# Patient Record
Sex: MALE | Race: WHITE | Employment: OTHER | URBAN - METROPOLITAN AREA
[De-identification: names, ages, dates, MRNs, and addresses within clinical notes are randomized per-mention and may not be internally consistent; named-entity substitution may affect disease eponyms.]

---

## 2017-02-16 PROCEDURE — 82043 UR ALBUMIN QUANTITATIVE: CPT | Performed by: FAMILY MEDICINE

## 2017-02-16 PROCEDURE — 82570 ASSAY OF URINE CREATININE: CPT | Performed by: FAMILY MEDICINE

## 2017-02-16 PROCEDURE — 81001 URINALYSIS AUTO W/SCOPE: CPT | Performed by: FAMILY MEDICINE

## 2017-03-09 PROBLEM — E11.22 STAGE 3 CHRONIC RENAL IMPAIRMENT ASSOCIATED WITH TYPE 2 DIABETES MELLITUS (HCC): Status: ACTIVE | Noted: 2017-03-09

## 2017-03-09 PROBLEM — N18.30 STAGE 3 CHRONIC RENAL IMPAIRMENT ASSOCIATED WITH TYPE 2 DIABETES MELLITUS (HCC): Status: ACTIVE | Noted: 2017-03-09

## 2018-06-15 PROCEDURE — 36415 COLL VENOUS BLD VENIPUNCTURE: CPT | Performed by: FAMILY MEDICINE

## 2018-06-15 PROCEDURE — 82570 ASSAY OF URINE CREATININE: CPT | Performed by: FAMILY MEDICINE

## 2018-06-15 PROCEDURE — 82043 UR ALBUMIN QUANTITATIVE: CPT | Performed by: FAMILY MEDICINE

## 2018-07-03 PROBLEM — M65.332 TRIGGER MIDDLE FINGER OF LEFT HAND: Status: ACTIVE | Noted: 2018-07-03

## 2019-03-22 PROBLEM — M65.332 TRIGGER MIDDLE FINGER OF LEFT HAND: Status: RESOLVED | Noted: 2018-07-03 | Resolved: 2019-03-22

## 2019-03-22 PROBLEM — E11.9 TYPE 2 DIABETES MELLITUS WITHOUT COMPLICATION, WITHOUT LONG-TERM CURRENT USE OF INSULIN (HCC): Status: ACTIVE | Noted: 2019-03-22

## 2019-05-26 ENCOUNTER — APPOINTMENT (OUTPATIENT)
Dept: CT IMAGING | Facility: HOSPITAL | Age: 84
DRG: 871 | End: 2019-05-26
Payer: MEDICARE

## 2019-05-26 ENCOUNTER — HOSPITAL ENCOUNTER (INPATIENT)
Facility: HOSPITAL | Age: 84
LOS: 3 days | Discharge: HOME HEALTH | DRG: 871 | End: 2019-05-29
Admitting: INTERNAL MEDICINE
Payer: MEDICARE

## 2019-05-26 ENCOUNTER — APPOINTMENT (OUTPATIENT)
Dept: GENERAL RADIOLOGY | Facility: HOSPITAL | Age: 84
DRG: 871 | End: 2019-05-26
Payer: MEDICARE

## 2019-05-26 ENCOUNTER — APPOINTMENT (OUTPATIENT)
Dept: CV DIAGNOSTICS | Facility: HOSPITAL | Age: 84
DRG: 871 | End: 2019-05-26
Attending: INTERNAL MEDICINE
Payer: MEDICARE

## 2019-05-26 DIAGNOSIS — J18.9 COMMUNITY ACQUIRED PNEUMONIA OF LEFT LOWER LOBE OF LUNG: ICD-10-CM

## 2019-05-26 DIAGNOSIS — A41.9 SEPSIS DUE TO URINARY TRACT INFECTION (HCC): Primary | ICD-10-CM

## 2019-05-26 DIAGNOSIS — R53.83 FATIGUE, UNSPECIFIED TYPE: ICD-10-CM
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
## 2019-05-26 DIAGNOSIS — N39.0 SEPSIS DUE TO URINARY TRACT INFECTION (HCC): Primary | ICD-10-CM

## 2019-05-26 PROCEDURE — 85730 THROMBOPLASTIN TIME PARTIAL: CPT

## 2019-05-26 PROCEDURE — 93306 TTE W/DOPPLER COMPLETE: CPT | Performed by: INTERNAL MEDICINE

## 2019-05-26 PROCEDURE — 84484 ASSAY OF TROPONIN QUANT: CPT

## 2019-05-26 PROCEDURE — 83735 ASSAY OF MAGNESIUM: CPT | Performed by: INTERNAL MEDICINE

## 2019-05-26 PROCEDURE — 71045 X-RAY EXAM CHEST 1 VIEW: CPT

## 2019-05-26 PROCEDURE — 87081 CULTURE SCREEN ONLY: CPT | Performed by: INTERNAL MEDICINE

## 2019-05-26 PROCEDURE — 85027 COMPLETE CBC AUTOMATED: CPT

## 2019-05-26 PROCEDURE — 81001 URINALYSIS AUTO W/SCOPE: CPT

## 2019-05-26 PROCEDURE — 99285 EMERGENCY DEPT VISIT HI MDM: CPT

## 2019-05-26 PROCEDURE — 83605 ASSAY OF LACTIC ACID: CPT

## 2019-05-26 PROCEDURE — 85027 COMPLETE CBC AUTOMATED: CPT | Performed by: INTERNAL MEDICINE

## 2019-05-26 PROCEDURE — 83036 HEMOGLOBIN GLYCOSYLATED A1C: CPT | Performed by: INTERNAL MEDICINE

## 2019-05-26 PROCEDURE — 85007 BL SMEAR W/DIFF WBC COUNT: CPT

## 2019-05-26 PROCEDURE — 80053 COMPREHEN METABOLIC PANEL: CPT

## 2019-05-26 PROCEDURE — 87086 URINE CULTURE/COLONY COUNT: CPT

## 2019-05-26 PROCEDURE — 87088 URINE BACTERIA CULTURE: CPT

## 2019-05-26 PROCEDURE — 85025 COMPLETE CBC W/AUTO DIFF WBC: CPT

## 2019-05-26 PROCEDURE — 96367 TX/PROPH/DG ADDL SEQ IV INF: CPT

## 2019-05-26 PROCEDURE — 87186 SC STD MICRODIL/AGAR DIL: CPT

## 2019-05-26 PROCEDURE — 93010 ELECTROCARDIOGRAM REPORT: CPT

## 2019-05-26 PROCEDURE — 96365 THER/PROPH/DIAG IV INF INIT: CPT

## 2019-05-26 PROCEDURE — 93005 ELECTROCARDIOGRAM TRACING: CPT

## 2019-05-26 PROCEDURE — 84484 ASSAY OF TROPONIN QUANT: CPT | Performed by: INTERNAL MEDICINE

## 2019-05-26 PROCEDURE — 82962 GLUCOSE BLOOD TEST: CPT

## 2019-05-26 PROCEDURE — 85610 PROTHROMBIN TIME: CPT

## 2019-05-26 PROCEDURE — 70450 CT HEAD/BRAIN W/O DYE: CPT

## 2019-05-26 PROCEDURE — 87040 BLOOD CULTURE FOR BACTERIA: CPT

## 2019-05-26 PROCEDURE — 93010 ELECTROCARDIOGRAM REPORT: CPT | Performed by: INTERNAL MEDICINE

## 2019-05-26 RX ORDER — HEPARIN SODIUM 5000 [USP'U]/ML
5000 INJECTION, SOLUTION INTRAVENOUS; SUBCUTANEOUS EVERY 8 HOURS SCHEDULED
Status: DISCONTINUED | OUTPATIENT
Start: 2019-05-26 | End: 2019-05-28

## 2019-05-26 RX ORDER — PHENAZOPYRIDINE HYDROCHLORIDE 200 MG/1
200 TABLET, FILM COATED ORAL
Status: COMPLETED | OUTPATIENT
Start: 2019-05-26 | End: 2019-05-28

## 2019-05-26 RX ORDER — DEXTROSE MONOHYDRATE 25 G/50ML
50 INJECTION, SOLUTION INTRAVENOUS
Status: DISCONTINUED | OUTPATIENT
Start: 2019-05-26 | End: 2019-05-29

## 2019-05-26 RX ORDER — SODIUM CHLORIDE 9 MG/ML
INJECTION, SOLUTION INTRAVENOUS CONTINUOUS
Status: ACTIVE | OUTPATIENT
Start: 2019-05-26 | End: 2019-05-26

## 2019-05-26 RX ORDER — SODIUM CHLORIDE 9 MG/ML
INJECTION, SOLUTION INTRAVENOUS CONTINUOUS
Status: DISCONTINUED | OUTPATIENT
Start: 2019-05-26 | End: 2019-05-28

## 2019-05-26 RX ORDER — METOCLOPRAMIDE HYDROCHLORIDE 5 MG/ML
5 INJECTION INTRAMUSCULAR; INTRAVENOUS EVERY 8 HOURS PRN
Status: DISCONTINUED | OUTPATIENT
Start: 2019-05-26 | End: 2019-05-29

## 2019-05-26 RX ORDER — ACETAMINOPHEN 500 MG
1000 TABLET ORAL ONCE
Status: COMPLETED | OUTPATIENT
Start: 2019-05-26 | End: 2019-05-26

## 2019-05-26 RX ORDER — ATORVASTATIN CALCIUM 40 MG/1
40 TABLET, FILM COATED ORAL NIGHTLY
Status: DISCONTINUED | OUTPATIENT
Start: 2019-05-26 | End: 2019-05-29

## 2019-05-26 RX ORDER — ACETAMINOPHEN 325 MG/1
650 TABLET ORAL EVERY 6 HOURS PRN
Status: DISCONTINUED | OUTPATIENT
Start: 2019-05-26 | End: 2019-05-29

## 2019-05-26 RX ORDER — ONDANSETRON 2 MG/ML
4 INJECTION INTRAMUSCULAR; INTRAVENOUS EVERY 6 HOURS PRN
Status: DISCONTINUED | OUTPATIENT
Start: 2019-05-26 | End: 2019-05-29

## 2019-05-26 NOTE — ED INITIAL ASSESSMENT (HPI)
A/O x 4. Patient presents with generalized weakness, fall x 2 today, difficulty urinating and fever at home per family. Patient states that his fall earlier was off the toilet, and the send fall was out of bed.   Denies any head injury

## 2019-05-26 NOTE — PLAN OF CARE
Received patient at 7am just admitted from ER with septic shock, UTI, ARF, elevated troponin, falls at home. Alert and oriented. Denies pain. See full assessment. Sepsis protocol followed. Cardiology consulted, orders received.  Plan to monitor closely in I

## 2019-05-26 NOTE — PROGRESS NOTES
[facility] Pharmacy Note: Antimicrobial Weight Dose Adjustment for: piperacillin/tazobactam (Marietta Arias)    Sayra Duckworth is a 80year old male who has been prescribed piperacillin/tazobactam (ZOSYN) 3.375 gm x 1 dose.   CrCl is CrCl cannot be calculated (Patient'

## 2019-05-26 NOTE — CONSULTS
[facility] Cardiology Consultation    Yoon Gonzalez Patient Status:  Inpatient    1929 MRN [id number]   [facility] 4SW-A Attending Ector Marie MD   Hosp Day # 0 PCP Kiera Mcdowell MD     Reason for Consultation:  Elevated tr SWELLING    Medications:  • Heparin Sodium (Porcine)  5,000 Units Subcutaneous Q8H [address]   • piperacillin-tazobactam  4.5 g Intravenous Q8H   • Insulin Aspart Pen  1-5 Units Subcutaneous TID CC and HS       Continuous Infusions:  • sodium chloride 83 mL/hr a Telemetry:     Laboratories and Data:  Diagnostics:    EKG, 5/26/2019:  No acute change     CXR, 5/26/2019: *        PROCEDURE:  XR CHEST AP PORTABLE  (CPT=71045)     TECHNIQUE:  AP chest radiograph was obtained. COMPARISON:  None.      EDDIE

## 2019-05-26 NOTE — H&P
[facility] Hospitalist H&P       CC: Patient presents with:  Fever (infectious)  Fall (musculoskeletal, neurologic)  Dizziness (neurologic)       PCP: Ariana Garcia MD    History of Present Illness:  79y/o M with hx CAD s/p remote CABG, HTN, HLD, DMII, [facility] Encounter):  glipiZIDE 10 MG Oral Tab Take 0.5 tablets (5 mg total) by mouth daily. Disp: 180 tablet Rfl: 3   Losartan Potassium 50 MG Oral Tab Take 1 tablet (50 mg total) by mouth once daily.  Disp: 90 tablet Rfl: 3   metoprolol Tartrate 25 MG Or COPD       Review of Systems  Comprehensive 10-point ROS reviewed and negative except for what is stated above in HPI. Including negative for fevers, chills, chest pain, shortness of breath, syncope.       OBJECTIVE:   05/26/19  0830 05/26/19  0900 COMPARISON:  None. INDICATIONS:  Fever, generalized weakness, and fall  TECHNIQUE:  Noncontrast CT scanning is performed through the brain. Dose reduction techniques were used.  Dose information is transmitted to the [facility] [employer] of Radiology) NRD CONCLUSION:  Opacification the left lung base could represent atelectasis and/or effusion.     Dictated by: Roberto Okeefe MD on 5/26/2019 at 8:13     Approved by: Roberto Okeefe MD                   ASSESSMENT / PLAN:     81y/o M with hx CAD s/p remote C

## 2019-05-26 NOTE — ED PROVIDER NOTES
Patient Seen in: [facility] Emergency Department    History   Patient presents with:  Fever (infectious)  Fall (musculoskeletal, neurologic)  Dizziness (neurologic)    Stated Complaint: Fever, generalized weakness, and fall    HPI    80year-old mal Jony Ziegler)    Hx colon polyps: diverticulosis   • HERNIA SURGERY      Bilateral   • HYDROCELECTOMY ADULT Left 11/13/2014    Performed by Brandin Hart MD at [facility] MAIN OR   • TONSILLECTOMY             Social History    Tobacco Use      Smoking status: For tenderness throughout  No midline cervical thoracic or lumbosacral spine tenderness  Skin: Unremarkable without lesions or rash. Neurologic: Awake alert and oriented x 3 with clear speech    General fatigue, 4 out of 5 strength all extremities equally. Abnormal; Notable for the following components:    POC Glucose 124 (*)     All other components within normal limits   CBC W/ DIFFERENTIAL - Abnormal; Notable for the following components:    WBC 25.7 (*)     HGB 12.8 (*)     HCT 38.3 (*)     RDW-SD 49.4 ( calcifications.         [facility]      Sepsis Reassessment Note    /41   Pulse 85   Temp 98.5 °F (36.9 °C) (Temporal)   Resp 19   Ht 177.8 cm (5' 10\")   Wt 117.8 kg   SpO2 95%   BMI 37.26 kg/m²      6:51 AM    Cardiac:  Regularity: Regular  Rat

## 2019-05-26 NOTE — PLAN OF CARE
Patient having severe burning pain in penis area, Tyshawn ANNE notified and pyridium ordered and administered. Set up in bed for dinner and report given to [facility].

## 2019-05-26 NOTE — PROGRESS NOTES
[facility] Pharmacy Note:  Renal Dose Adjustment for Metoclopramide (REGLAN)    Aurea Aguilar has been prescribed Metoclopramide (REGLAN) 10 mg every 8 hours as needed for nausea/vomiting.     Estimated Creatinine Clearance: 27.9 mL/min (A) (based on SCr of 1

## 2019-05-26 NOTE — ED NOTES
Family to nursing station stating pt is feeling weak. Accucheck completed and WNL. Requesting juice, notified of NPO status.

## 2019-05-26 NOTE — CONSULTS
Critical Care H&P/Consult       NAME: 2 [facility],[address] Floor: 104/128-M - MRN: [id number] - Age: 80year old - :  1929    Date of Admission: 2019  2:01 AM  Admission Diagnosis: Sepsis due to urinary tract infection ([facility] [A41.9, N39.0]  Fall Stella Quinonez MD at [facility] MAIN OR   • TONSILLECTOMY          Medications Prior to Admission:  glipiZIDE 10 MG Oral Tab Take 0.5 tablets (5 mg total) by mouth daily.  Disp: 180 tablet Rfl: 3 Taking   Losartan Potassium 50 MG Oral Tab Take 1 tablet (50 mg t file      Years of education: Not on file      Highest education level: Not on file    Occupational History      Not on file    Social Needs      Financial resource strain: Not on file      Food insecurity:        Worry: Not on file        Inability: Not o TABLET TWICE DAILY Disp: 180 tablet Rfl: 3   hydrochlorothiazide 25 MG Oral Tab Take 1 tablet (25 mg total) by mouth once daily.  Disp: 90 tablet Rfl: 3   tamsulosin HCl 0.4 MG Oral Cap TAKE 1 CAPSULE EVERY DAY Disp: 90 capsule Rfl: 3   indomethacin 50 MG O pain/tenderness  RESP:  denies SOB, cough, dyspnea   CARDS:  denies current chest pain   GI:  denies abdominal pain  :  denies dysuria or changes in stream   MSK:  denies back pain   NEURO:  denies headaches, no strokes or seizure history   PSYCH:  denie Soft, non-tender, bowel sounds active all four quadrants,     no masses, no organomegaly   Extremities:   Extremities normal, atraumatic, no cyanosis or edema   Pulses:   2+ and symmetric all extremities   Skin:   Skin color, texture, turgor normal, no ra replace as needed  -diet as tolerated  7. Proph  -heparin  8.  Dispo  -ICU        Critical Care Time greater than: [facility] Pulmonary and Critical Care

## 2019-05-26 NOTE — PROGRESS NOTES
[facility] Pharmacy Note: Antimicrobial Weight Dose Adjustment for: piperacillin/tazobactam (Farhad RomeroAnna Garcia is a 80year old male who has been prescribed piperacillin/tazobactam (ZOSYN) 3.375 gm every 8 hours.   CrCl is estimated creatinine clearance

## 2019-05-27 PROCEDURE — 83605 ASSAY OF LACTIC ACID: CPT | Performed by: INTERNAL MEDICINE

## 2019-05-27 PROCEDURE — 82962 GLUCOSE BLOOD TEST: CPT

## 2019-05-27 PROCEDURE — 85027 COMPLETE CBC AUTOMATED: CPT | Performed by: INTERNAL MEDICINE

## 2019-05-27 PROCEDURE — 97162 PT EVAL MOD COMPLEX 30 MIN: CPT

## 2019-05-27 PROCEDURE — 80048 BASIC METABOLIC PNL TOTAL CA: CPT | Performed by: INTERNAL MEDICINE

## 2019-05-27 PROCEDURE — 93005 ELECTROCARDIOGRAM TRACING: CPT

## 2019-05-27 PROCEDURE — 97530 THERAPEUTIC ACTIVITIES: CPT

## 2019-05-27 RX ORDER — ALFUZOSIN HYDROCHLORIDE 10 MG/1
10 TABLET, EXTENDED RELEASE ORAL
Status: DISCONTINUED | OUTPATIENT
Start: 2019-05-27 | End: 2019-05-29

## 2019-05-27 RX ORDER — OXYBUTYNIN CHLORIDE 5 MG/1
5 TABLET, EXTENDED RELEASE ORAL DAILY
Status: DISCONTINUED | OUTPATIENT
Start: 2019-05-27 | End: 2019-05-28

## 2019-05-27 NOTE — PROGRESS NOTES
[facility] Hospitalist Progress Note     Olga Lidia Dumas Patient Status:  Inpatient    1929 MRN [id number]   [facility] 5NW-A Attending Farzad Balderas MD   Hosp Day # 1 PCP Collette Quitter, MD     CC: follow up    SUBJECTIVE:  Columba Patel Subcutaneous Q8H [address]   • piperacillin-tazobactam  4.5 g Intravenous Q8H   • Insulin Aspart Pen  1-5 Units Subcutaneous TID CC and HS   • atorvastatin  40 mg Oral Nightly   • Phenazopyridine HCl  200 mg Oral TID CC     Continuous Infusing Medication:  • sod

## 2019-05-27 NOTE — PLAN OF CARE
Received pt alert, oriented, following commands. On room air with diminished breath sounds. Occasional expiratory wheezes. Afebrile. Blood pressure stable. Normal sinus rhythm. SCD's placed for DVT prophylaxis. Bmx2 overnight.  Inability to urinate requirin

## 2019-05-27 NOTE — PROGRESS NOTES
Patient complaining of intermittent penial pains/bladder spasms that continue to \"come in waves\" despite initiation of pyridium last evening. Will add Ditropan to regimen and follow response.     [facility] on-call, Dr Ryanne Aranda, aware of above    Michelle Worley,

## 2019-05-27 NOTE — PROGRESS NOTES
NURSING ADMISSION NOTE      Patient transferred via Cart to room 512 from ICU  Oriented to room. Safety precautions initiated. Bed in low position. Call light in reach.     VSS, afebrile, maintaining O2 sats >94% on RA

## 2019-05-27 NOTE — PROGRESS NOTES
[facility] Cardiology Progress Note        Chuy Fulton Patient Status:  Inpatient    1929 MRN [id number]   [facility] 5NW-A Attending Wen Spring MD   Hosp Day # 1 PCP Elkin Fernandez MD     Sub percussion. Abdomen: Soft, non-tender. Extremities: No LE edema. No clubbing or cyanosis. Neurologic: Alert and oriented, normal affect. Skin: Warm and dry.            LABS:      HEM:  Recent Labs   Lab 05/26/19  0213 05/26/19  5649 05/27/19  0435

## 2019-05-27 NOTE — PROGRESS NOTES
Quick note  Pt transferred out of ICU overnight  No pulm issues  Will follow PRN, please call w/ Questions

## 2019-05-27 NOTE — PLAN OF CARE
Pt AOx4. Glasses. VSS on RA. . Tele, NSR/1st degree. Subq hep. Scds. Dw. Sonny Goldberg. Pt retaining urine. Following urinary retention protocol. Pt was straight cathd this AM with output of 1L. Pt c.o penile pain, see MAR. Up x1 with walker. Ivf. Iv abx.  Diabe schedule  Outcome: Progressing     Problem: MUSCULOSKELETAL - ADULT  Goal: Return mobility to safest level of function  Description  INTERVENTIONS:  - Assess patient stability and activity tolerance for standing, transferring and ambulating w/ or w/o kole range  - Assess barriers to adequate nutritional intake and initiate nutrition consult as needed  - Instruct patient on self management of diabetes  Outcome: Progressing     Problem: Patient/Family Goals  Goal: Patient/Family Long Term Goal  Description  P

## 2019-05-27 NOTE — PHYSICAL THERAPY NOTE
PHYSICAL THERAPY EVALUATION - INPATIENT     Room Number: 999/901-D  Evaluation Date: 5/27/2019  Type of Evaluation: Initial  Physician Order: PT Eval and Treat    Presenting Problem: Weakness; fall at home prior to admit.    Reason for Therapy: Mobility EDW Shan Bee)    Hx colon polyps: diverticulosis   • HERNIA SURGERY      Bilateral   • HYDROCELECTOMY ADULT Left 11/13/2014    Performed by Lang Marquez MD at [facility] MAIN OR   • TONSILLECTOMY         HOME SITUATION  Type of Home: House   Home Layout:  On Turning over in bed (including adjusting bedclothes, sheets and blankets)?: None   -   Sitting down on and standing up from a chair with arms (e.g., wheelchair, bedside commode, etc.): A Little   -   Moving from lying on back to sitting on the side of the Exercise/Education Provided:    Educ: activity recommendations, role of inpt PT, DC recommendation, assessment findings, goals and expectations.      Functional activity tolerated    Patient End of Session: Up in chair;Needs met;Call light within reac Daily  Number of Visits to Meet Established Goals: 5    CURRENT GOALS       Goal #1 Patient is able to demonstrate supine - sit EOB @ level: modified independent     Goal #2 Patient is able to demonstrate transfers Sit to/from Stand at assistance level: mo

## 2019-05-28 ENCOUNTER — APPOINTMENT (OUTPATIENT)
Dept: CT IMAGING | Facility: HOSPITAL | Age: 84
DRG: 871 | End: 2019-05-28
Attending: PHYSICIAN ASSISTANT
Payer: MEDICARE

## 2019-05-28 PROCEDURE — 74176 CT ABD & PELVIS W/O CONTRAST: CPT | Performed by: PHYSICIAN ASSISTANT

## 2019-05-28 PROCEDURE — 36415 COLL VENOUS BLD VENIPUNCTURE: CPT

## 2019-05-28 PROCEDURE — 93005 ELECTROCARDIOGRAM TRACING: CPT

## 2019-05-28 PROCEDURE — 82962 GLUCOSE BLOOD TEST: CPT

## 2019-05-28 PROCEDURE — 97164 PT RE-EVAL EST PLAN CARE: CPT

## 2019-05-28 PROCEDURE — 93010 ELECTROCARDIOGRAM REPORT: CPT | Performed by: INTERNAL MEDICINE

## 2019-05-28 PROCEDURE — 85025 COMPLETE CBC W/AUTO DIFF WBC: CPT | Performed by: INTERNAL MEDICINE

## 2019-05-28 PROCEDURE — 51702 INSERT TEMP BLADDER CATH: CPT

## 2019-05-28 PROCEDURE — 97530 THERAPEUTIC ACTIVITIES: CPT

## 2019-05-28 PROCEDURE — 80048 BASIC METABOLIC PNL TOTAL CA: CPT | Performed by: INTERNAL MEDICINE

## 2019-05-28 PROCEDURE — 84132 ASSAY OF SERUM POTASSIUM: CPT | Performed by: INTERNAL MEDICINE

## 2019-05-28 RX ORDER — DILTIAZEM HYDROCHLORIDE 5 MG/ML
10 INJECTION INTRAVENOUS ONCE
Status: COMPLETED | OUTPATIENT
Start: 2019-05-28 | End: 2019-05-28

## 2019-05-28 RX ORDER — DILTIAZEM HYDROCHLORIDE 60 MG/1
60 TABLET, FILM COATED ORAL EVERY 6 HOURS SCHEDULED
Status: DISCONTINUED | OUTPATIENT
Start: 2019-05-28 | End: 2019-05-29

## 2019-05-28 RX ORDER — METOPROLOL TARTRATE 50 MG/1
50 TABLET, FILM COATED ORAL
Status: DISCONTINUED | OUTPATIENT
Start: 2019-05-28 | End: 2019-05-29

## 2019-05-28 RX ORDER — FLUTICASONE PROPIONATE 50 MCG
1 SPRAY, SUSPENSION (ML) NASAL DAILY
Status: DISCONTINUED | OUTPATIENT
Start: 2019-05-28 | End: 2019-05-29

## 2019-05-28 RX ORDER — DILTIAZEM HYDROCHLORIDE 60 MG/1
60 TABLET, FILM COATED ORAL AS NEEDED
Status: DISCONTINUED | OUTPATIENT
Start: 2019-05-28 | End: 2019-05-29

## 2019-05-28 RX ORDER — METOPROLOL TARTRATE 50 MG/1
50 TABLET, FILM COATED ORAL
Status: DISCONTINUED | OUTPATIENT
Start: 2019-05-28 | End: 2019-05-28

## 2019-05-28 RX ORDER — DILTIAZEM HYDROCHLORIDE 5 MG/ML
5 INJECTION INTRAVENOUS ONCE
Status: DISCONTINUED | OUTPATIENT
Start: 2019-05-28 | End: 2019-05-28

## 2019-05-28 RX ORDER — POTASSIUM CHLORIDE 20 MEQ/1
40 TABLET, EXTENDED RELEASE ORAL EVERY 4 HOURS
Status: COMPLETED | OUTPATIENT
Start: 2019-05-28 | End: 2019-05-28

## 2019-05-28 NOTE — CM/SW NOTE
05/28/19 1000   CM/SW Referral Data   Referral Source Physician;Social Work (self-referral)   Reason for Referral Discharge planning   Informant Patient; Children   Pertinent Medical Hx   Primary Care Physician Name [facility]   Patient Info   Patient's

## 2019-05-28 NOTE — PHYSICAL THERAPY NOTE
Pt transferred from #512 to #1628 for cardiac workup. Attempted to reach RN via phone at [phone]. Per department protocol - will require PT re-eval when medically appropriate.   Per IPTE on 5/27/2019 - d/c recommendation = AJITH

## 2019-05-28 NOTE — PROGRESS NOTES
[facility] Pharmacy Note: Antimicrobial Weight Dose Adjustment for: ceftriaxone (ROCEPHIN)    Stuart Armstrong is a 80year old male who has been prescribed ceftriaxone (ROCEPHIN) 1000 mg every 24 hours.   CrCl is estimated creatinine clearance is 42.2 mL/min (b

## 2019-05-28 NOTE — PLAN OF CARE
Patient received as transfer from [facility]. Patient a&ox4. On arrival - afib on tele with HR to 140's. Cardizem bolus was given prior to transfer. Cardiology paged and orders received to start Cardizem drip per protocol. Drip infusing at 10mg/hour.  -110's

## 2019-05-28 NOTE — OCCUPATIONAL THERAPY NOTE
Pt with OT orders from 5/26. Pt is s/p transfer from PMU to CTU 5/28 due to Afib. Pt placed on hold. Will need new OT orders as appropriate.

## 2019-05-28 NOTE — PHYSICAL THERAPY NOTE
PHYSICAL THERAPY EVALUATION - INPATIENT     Room Number: 5939  Evaluation Date: 5/28/2019  Type of Evaluation: Re-evaluation  Physician Order: (Reevaluation)    Presenting Problem: Weakness; fall at home prior to admit.    Reason for Therapy: Mobility D COLONOSCOPY,DIAGNOSTIC  5/15/07  EDW Gretchen Fuentes)    Hx colon polyps: diverticulosis   • HERNIA SURGERY      Bilateral   • HYDROCELECTOMY ADULT Left 11/13/2014    Performed by Satish Flower MD at [facility] MAIN OR   • [address]  Type O2 WALK  SPO2 on Room Air at Rest: 98        AM-PAC '6-Clicks' [address]  How much difficulty does the patient currently have. ..  -   Turning over in bed (including adjusting bedclothes, sheets and blankets)?: None   -   Sitti within reach;RN aware of session/findings; All patient questions and concerns addressed; Discussed recommendations with /(being transported for Renal scan)    Eval completed.     ASSESSMENT     Patient is a 80year old male admitted o training;Strengthening;Stair training;Transfer training;Balance training  Rehab Potential : Good  Frequency (Obs): Daily  Number of Visits to Meet Established Goals: 5    CURRENT GOALS       Goal #1 Patient is able to demonstrate supine - sit EOB @ level:

## 2019-05-28 NOTE — CM/SW NOTE
1:10pm  MSW called Pt's dtr-VM full.    2:46pm  MSW spoke to patient's dtr and she is agreeable to [facility].  MSW emailed her info on [place]  program. Dtr states she plans to meet with visiting jermaine on 6/3 to get private duty services in the home for her

## 2019-05-28 NOTE — HOME CARE LIAISON
Referral from [facility]. Met with patient to offer home health. Patient requested that TNL speak with daughter. Unable to leave a voice message for daughter, Yolanda Sims, her mailbox is full. Will try again later.   Brochure and contact information given to

## 2019-05-28 NOTE — PROGRESS NOTES
Patient A/Ox4. Vitals stable. Wheezing to lungs. Difficulty starting stream, complains of pain with urination. Pyridium ordered TID. Heparin. SCD. Patient is on tele; NSR with 1st degree block. Up x 1 with walker. Briefed.        Patient converted to Afib R

## 2019-05-28 NOTE — PROGRESS NOTES
[facility] Cardiology Progress Note        Bar Judge Patient Status:  Inpatient    1929 MRN [id number]   [facility] 8NE-A Attending Kerrin Cowden, MD   Hosp Day # 2 PCP Virl Dubin, MD     [address]  Resp:  [16-20] 18  BP: (111-159)/(50-92) 111/57      Temp: 98 °F (36.7 °C)  Pulse: 66  Resp: 18  BP: 111/57  General:  Appears comfortable  HEENT: No focal deficits. Neck: No JVD, carotids 2+ no bruits. Cardiac: Irregular S1S2.   No S3, S4, rub, click comparison. *                          Impression:     1.  Septic shock . 2/2 urosepsis? Olya Band LLL infiltrate vs. PNA on CXR  2.  Hypertension    3.  CAD, s/p 4 vessel  CABG, 1998  4.  acute renal insufficiency, 2/2 sepsis.   resolved  5.  Elevated troponin ,

## 2019-05-28 NOTE — HOME CARE LIAISON
Received call back from daughter, Sadia Toribio. She is agreeable to [facility] for RN PT and HHA services, pending insurance copays. [facility] will run insurance and inform Sadia Toribio once insurance is verified.   Thanks for the referral.    Updated daughter

## 2019-05-28 NOTE — CONSULTS
[facility] Urology   Consultation Note    Chuy Fulton Patient Status:  Inpatient    1929 MRN [id number]   [facility] 8NE-A Attending Wen Spring MD   [facility] Day # 2 PCP Elkin Fernandez MD     Reason for Consu hydrocele 7/25/2014   • Melanoma ([facility] [address]     Left cheek (10 yrs ago)   • Mixed hyperlipidemia 7/25/2014   • Obesity 7/25/2014   • Other and unspecified hyperlipidemia    • Trigger middle finger of left hand 7/3/2018   • Type II or unspecified type diabetes m mg, 650 mg, Oral, Q6H PRN  •  ondansetron HCl (ZOFRAN) injection 4 mg, 4 mg, Intravenous, Q6H PRN  •  Metoclopramide HCl (REGLAN) injection 5 mg, 5 mg, Intravenous, Q8H PRN  •  Piperacillin Sod-Tazobactam So (ZOSYN) 4.5 g in dextrose 5 % 100 mL ADD-vantage 25.7-->23.1-->22-->16.6    Serum creat 1.82-->2.07-->1.41-->1.2    UA 5/26/19: >50 WBC/hpf, >10 RBC/hpf, 3+ bacteria, small squamous epithelial cells, WBC clump present  Urine culture 5/26/19: >100K CFU/mL E.  Coli    2/2 Blood culture 5/26/19: prelim no gr

## 2019-05-28 NOTE — PLAN OF CARE
Controlled a fib on telemetry. Cardizem drip maintained at 5mg/hr with rates . VSS. No c/o cardiac symptoms. Cardizem drip discontinued. PO Cardizem started. Rates currently in the low 100's. Sorto in place d/t urinary retention overnight.   appropriate  - Consider OT/PT consult to assist with strengthening/mobility  - Encourage toileting schedule  Outcome: Progressing     Problem: MUSCULOSKELETAL - ADULT  Goal: Return mobility to safest level of function  Description  INTERVENTIONS:  - Assess hyperglycemia and hypoglycemia  - Administer ordered medications to maintain glucose within target range  - Assess barriers to adequate nutritional intake and initiate nutrition consult as needed  - Instruct patient on self management of diabetes  Outcome:

## 2019-05-28 NOTE — PROGRESS NOTES
[facility] Hospitalist Progress Note     Irina Quarles Patient Status:  Inpatient    1929 MRN [id number]   [facility] 8NE-A Attending Mayco Woods MD   Hosp Day # 2 PCP Keya De La Vega MD     CC: follow up    SUBJECTIVE:  Is dilTIAZem HCl  60 mg Oral 4 times per day   • rivaroxaban  20 mg Oral Daily with food   • Fluticasone Propionate  1 spray Each Nare Daily   • Alfuzosin HCl ER  10 mg Oral Daily with breakfast   • piperacillin-tazobactam  4.5 g Intravenous Q8H   • Insulin A

## 2019-05-29 VITALS
DIASTOLIC BLOOD PRESSURE: 60 MMHG | SYSTOLIC BLOOD PRESSURE: 134 MMHG | OXYGEN SATURATION: 96 % | HEIGHT: 70 IN | BODY MASS INDEX: 38.41 KG/M2 | HEART RATE: 76 BPM | WEIGHT: 268.31 LBS | RESPIRATION RATE: 18 BRPM | TEMPERATURE: 98 F

## 2019-05-29 PROCEDURE — 82962 GLUCOSE BLOOD TEST: CPT

## 2019-05-29 PROCEDURE — 85027 COMPLETE CBC AUTOMATED: CPT | Performed by: INTERNAL MEDICINE

## 2019-05-29 PROCEDURE — 97110 THERAPEUTIC EXERCISES: CPT

## 2019-05-29 PROCEDURE — 80048 BASIC METABOLIC PNL TOTAL CA: CPT | Performed by: INTERNAL MEDICINE

## 2019-05-29 PROCEDURE — 97535 SELF CARE MNGMENT TRAINING: CPT

## 2019-05-29 PROCEDURE — 97116 GAIT TRAINING THERAPY: CPT

## 2019-05-29 PROCEDURE — 97166 OT EVAL MOD COMPLEX 45 MIN: CPT

## 2019-05-29 RX ORDER — METOPROLOL TARTRATE 50 MG/1
50 TABLET, FILM COATED ORAL
Qty: 60 TABLET | Refills: 0 | Status: SHIPPED | OUTPATIENT
Start: 2019-05-29 | End: 2019-06-20

## 2019-05-29 RX ORDER — DILTIAZEM HYDROCHLORIDE 120 MG/1
120 CAPSULE, COATED, EXTENDED RELEASE ORAL 2 TIMES DAILY
Qty: 60 CAPSULE | Refills: 5 | Status: SHIPPED | OUTPATIENT
Start: 2019-05-29 | End: 2019-09-20

## 2019-05-29 RX ORDER — FINASTERIDE 5 MG/1
5 TABLET, FILM COATED ORAL DAILY
Status: DISCONTINUED | OUTPATIENT
Start: 2019-05-29 | End: 2019-05-29

## 2019-05-29 RX ORDER — METOCLOPRAMIDE HYDROCHLORIDE 5 MG/ML
10 INJECTION INTRAMUSCULAR; INTRAVENOUS EVERY 8 HOURS PRN
Status: DISCONTINUED | OUTPATIENT
Start: 2019-05-29 | End: 2019-05-29

## 2019-05-29 RX ORDER — FINASTERIDE 5 MG/1
5 TABLET, FILM COATED ORAL DAILY
Qty: 30 TABLET | Refills: 3 | Status: SHIPPED | OUTPATIENT
Start: 2019-05-30 | End: 2019-08-17

## 2019-05-29 RX ORDER — CEPHALEXIN 500 MG/1
500 CAPSULE ORAL 3 TIMES DAILY
Qty: 18 CAPSULE | Refills: 0 | Status: SHIPPED | OUTPATIENT
Start: 2019-05-29 | End: 2019-06-04

## 2019-05-29 RX ORDER — DILTIAZEM HYDROCHLORIDE 120 MG/1
120 CAPSULE, EXTENDED RELEASE ORAL 2 TIMES DAILY
Status: DISCONTINUED | OUTPATIENT
Start: 2019-05-29 | End: 2019-05-29

## 2019-05-29 RX ORDER — CEPHALEXIN 500 MG/1
500 CAPSULE ORAL 4 TIMES DAILY
Qty: 24 CAPSULE | Refills: 0 | Status: SHIPPED | OUTPATIENT
Start: 2019-05-29 | End: 2019-05-29

## 2019-05-29 NOTE — PLAN OF CARE
Problem: CARDIOVASCULAR - ADULT  Goal: Maintains optimal cardiac output and hemodynamic stability  Description  INTERVENTIONS:  - Monitor vital signs, rhythm, and trends  - Monitor for bleeding, hypotension and signs of decreased cardiac output  - Evalua safe patient handling equipment as needed  - Ensure adequate protection for wounds/incisions during mobilization  - Obtain PT/OT consults as needed  - Advance activity as appropriate  - Communicate ordered activity level and limitations with patient/family difficulty    Interventions:  - take ordered medications, austin trial  - See additional Care Plan goals for specific interventions   Outcome: Progressing  Goal: Patient/Family Short Term Goal  Description  Patient's Short Term Goal: pain controlled    Inte

## 2019-05-29 NOTE — OCCUPATIONAL THERAPY NOTE
OCCUPATIONAL THERAPY EVALUATION - INPATIENT     Room Number: 9475/8577-Y  Evaluation Date: 5/29/2019  Type of Evaluation: Initial  Presenting Problem: Sepsis, UTI, fall, weakness, pneumonia    Physician Order: IP Consult to Occupational Therapy  Reason for SINGLE  `5777   • COLONOSCOPY,DIAGNOSTIC  5/15/07  EDW Connie Swenson)    Hx colon polyps: diverticulosis   • HERNIA SURGERY      Bilateral   • HYDROCELECTOMY ADULT Left 11/13/2014    Performed by Lavon Jmi MD at [facility] MAIN OR   • [address] personal grooming such as brushing teeth?: None  -   Eating meals?: None    AM-PAC Score:  Score: 21  Approx Degree of Impairment: 32.79%  Standardized Score (AM-PAC Scale): 44.27  CMS Modifier (G-Code): CJ    FUNCTIONAL TRANSFER ASSESSMENT  Supine to Sit reach. These deficits impact the patient’s ability to participate in self-care, functional mobility, instrumental activities of daily living, rest and sleep, work, leisure and social participation.      The patient is functioning below his previous function

## 2019-05-29 NOTE — PLAN OF CARE
Received patient on bed, sleeping. Woke up, alert but disoriented of time and place. Reoriented easily. Denied any pain, denied SOB. On room air with O2 sat 92%. Afib controlled on tele. Sorto draining orange-colored urine. Discussed POC. Due meds given.  Laverne Esquivel

## 2019-05-29 NOTE — PROGRESS NOTES
Pharmacy Note: Renal dose adjustment   Aurea Agiular was originally prescribed metoclopramide 10 mg every 8 hours as needed which was renally dose adjusted at the time of the original order per P&T approved renal dosing protocol.   Renal function has n

## 2019-05-29 NOTE — PHYSICAL THERAPY NOTE
PHYSICAL THERAPY TREATMENT NOTE - INPATIENT    Room Number: 8293/2479-D     Session: 1   Number of Visits to Meet Established Goals: 5  Patient is 80year old male admitted on 5/26/2019 from home with c/o fall and weakness.   Patient diagnosed with septic Performed by Morro Ignacio MD at [address] [place] [address]  \"I just walked but I will do it again\"    Patient’s self-stated goal is go home    OBJECTIVE  Precautions: ([place])    WEIGHT BEARING RESTRICTION  Weight Bearing Restri with rw with cues for upright posture and placement of self within base of rw. Patient with SOB noted educated in pursed lip breathing and limited vocalization with ambulation. After seated rest performed standing exercises with rw.         THERAPEUTIC EX

## 2019-05-29 NOTE — PLAN OF CARE
Problem: MUSCULOSKELETAL - ADULT  Goal: Return mobility to safest level of function  Description  INTERVENTIONS:  - Assess patient stability and activity tolerance for standing, transferring and ambulating w/ or w/o assistive devices  - Assist with trans

## 2019-05-29 NOTE — PROGRESS NOTES
[facility] [address] [place] [address] Cardiology Progress Note        Levi Villalobos Patient Status:  Inpatient    1929 MRN [id number]   [facility] 8NE-A Attending Stephani Jason MD   Hosp Day # 3 PCP Daniel Navarro MD     Sub base  Abdomen: Soft, non-tender. Extremities: No LE edema. No clubbing or cyanosis. Neurologic: Alert and oriented, normal affect. Skin: Warm and dry.            LABS:      HEM:  Recent Labs   Lab 05/26/19  0213 05/26/19  0808 05/27/19  0434 05/28/19 insufficiency, 2/2 sepsis. resolved  5.  Elevated troponin , 2/2 renal insufficiency, demand ischemia. No evidence for ACS  6.  Diabetes    7. Atrial Fibrillation , HR now controlled.        Plan:       1.  Telemetry  2.  we started  anti-coag Rx for [employer]

## 2019-05-29 NOTE — PROGRESS NOTES
[facility]  Urology Progress Note    Tasha Newton Patient Status:  Inpatient    1929 MRN [id number]   [facility] 8NE-A Attending Pearl Livers, MD Jeaneen Closs Day # 3 PCP Maira Montoya MD     Subjective:   Nathanael Meng Ninoska Garcia P.A.-C  [facility] Urology  5/29/2019  10:21 AM

## 2019-05-29 NOTE — PLAN OF CARE
Problem: CARDIOVASCULAR - ADULT  Goal: Maintains optimal cardiac output and hemodynamic stability  Description  INTERVENTIONS:  - Monitor vital signs, rhythm, and trends  - Monitor for bleeding, hypotension and signs of decreased cardiac output  - Evalua Progressing     Problem: MUSCULOSKELETAL - ADULT  Goal: Return mobility to safest level of function  Description  INTERVENTIONS:  - Assess patient stability and activity tolerance for standing, transferring and ambulating w/ or w/o assistive devices  - Ass Diabetes/Glucose Control  Goal: Glucose maintained within prescribed range  Description  INTERVENTIONS:  - Monitor Blood Glucose as ordered  - Assess for signs and symptoms of hyperglycemia and hypoglycemia  - Administer ordered medications to maintain glu intake/output and perform bladder scan as needed  - Follow urinary retention protocol/standard of care  - Consider collaborating with pharmacy to review patient's medication profile  - Implement strategies to promote bladder emptying  5/29/2019 1518 by Tracy

## 2019-06-03 NOTE — DISCHARGE SUMMARY
General Medicine Discharge Summary     Patient ID:  Olga Lidia Dumas  80year old  4/28/1929    Admit date: 5/26/2019    Discharge date and time: 5/29/2019  7:13 PM     Attending Physician: No att. providers found     Primary Care Physician: Garry Mention °F (36.8 °C)       Gen: alert, oriented, NAD  Pulm: CTAB, normal respiratory effort  CV: RRR, nL S1/S2, no m/r/g  Abd: soft, NTND, BS+  MSK: moving all extremities, no LE edema  Neuro: CN II-XII grossly intact, no focal deficits  Skin: no rashes/lesions  P 5/29/2019  6:27 PM    START taking these medications    dilTIAZem HCl ER Coated Beads 120 MG Oral Capsule SR 24 Hr  Take 1 capsule (120 mg total) by mouth 2 (two) times daily. , Normal, Disp-60 capsule, R-5    Rivaroxaban 20 MG Oral Tab  Take 1 tablet (20 m daily.  , Historical    Magnesium 100 MG Oral Tab  Take  by mouth daily. , Historical    CENTRUM OR  Take 1 tablet by mouth daily, Historical    !! - Potential duplicate medications found. Please discuss with provider.       STOP taking these medications

## 2019-07-05 PROCEDURE — 87077 CULTURE AEROBIC IDENTIFY: CPT | Performed by: UROLOGY

## 2019-07-05 PROCEDURE — 87086 URINE CULTURE/COLONY COUNT: CPT | Performed by: UROLOGY

## 2019-07-05 PROCEDURE — 87184 SC STD DISK METHOD PER PLATE: CPT | Performed by: UROLOGY

## 2019-07-05 PROCEDURE — 87186 SC STD MICRODIL/AGAR DIL: CPT | Performed by: UROLOGY

## 2019-07-22 PROCEDURE — 87086 URINE CULTURE/COLONY COUNT: CPT | Performed by: UROLOGY

## 2019-08-01 PROBLEM — I70.0 AORTIC ATHEROSCLEROSIS (HCC): Status: ACTIVE | Noted: 2019-08-01

## 2019-08-06 PROCEDURE — 81001 URINALYSIS AUTO W/SCOPE: CPT | Performed by: FAMILY MEDICINE

## 2019-10-22 ENCOUNTER — APPOINTMENT (OUTPATIENT)
Dept: CT IMAGING | Facility: HOSPITAL | Age: 84
End: 2019-10-22
Attending: EMERGENCY MEDICINE
Payer: MEDICARE

## 2019-10-22 ENCOUNTER — HOSPITAL ENCOUNTER (OUTPATIENT)
Facility: HOSPITAL | Age: 84
Setting detail: OBSERVATION
Discharge: HOME | End: 2019-10-24
Attending: EMERGENCY MEDICINE | Admitting: HOSPITALIST
Payer: MEDICARE

## 2019-10-22 DIAGNOSIS — R41.82 ALTERED MENTAL STATUS, UNSPECIFIED ALTERED MENTAL STATUS TYPE: Primary | ICD-10-CM

## 2019-10-22 PROCEDURE — 70496 CT ANGIOGRAPHY HEAD: CPT | Performed by: EMERGENCY MEDICINE

## 2019-10-22 PROCEDURE — 70450 CT HEAD/BRAIN W/O DYE: CPT | Performed by: EMERGENCY MEDICINE

## 2019-10-22 PROCEDURE — 0042T CT STROKE(DAWN BRAIN)CTA BRAIN/CTA NECK+PERF(CPT=70496/70498/0042T): CPT | Performed by: EMERGENCY MEDICINE

## 2019-10-22 PROCEDURE — 70498 CT ANGIOGRAPHY NECK: CPT | Performed by: EMERGENCY MEDICINE

## 2019-10-22 RX ORDER — SODIUM CHLORIDE 9 MG/ML
125 INJECTION, SOLUTION INTRAVENOUS CONTINUOUS
Status: DISCONTINUED | OUTPATIENT
Start: 2019-10-22 | End: 2019-10-23

## 2019-10-22 RX ORDER — LABETALOL HYDROCHLORIDE 5 MG/ML
20 INJECTION, SOLUTION INTRAVENOUS ONCE
Status: COMPLETED | OUTPATIENT
Start: 2019-10-22 | End: 2019-10-22

## 2019-10-22 RX ORDER — ONDANSETRON 2 MG/ML
4 INJECTION INTRAMUSCULAR; INTRAVENOUS ONCE
Status: COMPLETED | OUTPATIENT
Start: 2019-10-22 | End: 2019-10-22

## 2019-10-22 RX ORDER — ASPIRIN 325 MG
325 TABLET, DELAYED RELEASE (ENTERIC COATED) ORAL ONCE
Status: COMPLETED | OUTPATIENT
Start: 2019-10-22 | End: 2019-10-22

## 2019-10-23 ENCOUNTER — APPOINTMENT (OUTPATIENT)
Dept: MRI IMAGING | Facility: HOSPITAL | Age: 84
End: 2019-10-23
Attending: Other
Payer: MEDICARE

## 2019-10-23 PROBLEM — R41.82 ALTERED MENTAL STATUS, UNSPECIFIED ALTERED MENTAL STATUS TYPE: Status: ACTIVE | Noted: 2019-10-23

## 2019-10-23 PROCEDURE — 70551 MRI BRAIN STEM W/O DYE: CPT | Performed by: OTHER

## 2019-10-23 PROCEDURE — 99204 OFFICE O/P NEW MOD 45 MIN: CPT | Performed by: OTHER

## 2019-10-23 RX ORDER — SODIUM CHLORIDE 9 MG/ML
INJECTION, SOLUTION INTRAVENOUS CONTINUOUS
Status: DISCONTINUED | OUTPATIENT
Start: 2019-10-23 | End: 2019-10-24

## 2019-10-23 RX ORDER — ONDANSETRON 2 MG/ML
4 INJECTION INTRAMUSCULAR; INTRAVENOUS EVERY 6 HOURS PRN
Status: DISCONTINUED | OUTPATIENT
Start: 2019-10-23 | End: 2019-10-24

## 2019-10-23 RX ORDER — ASPIRIN 81 MG/1
81 TABLET, CHEWABLE ORAL DAILY
Status: DISCONTINUED | OUTPATIENT
Start: 2019-10-24 | End: 2019-10-24

## 2019-10-23 RX ORDER — FINASTERIDE 5 MG/1
5 TABLET, FILM COATED ORAL DAILY
Status: DISCONTINUED | OUTPATIENT
Start: 2019-10-23 | End: 2019-10-24

## 2019-10-23 RX ORDER — ACETAMINOPHEN 325 MG/1
650 TABLET ORAL EVERY 6 HOURS PRN
Status: DISCONTINUED | OUTPATIENT
Start: 2019-10-23 | End: 2019-10-24

## 2019-10-23 RX ORDER — ATORVASTATIN CALCIUM 40 MG/1
40 TABLET, FILM COATED ORAL NIGHTLY
Status: DISCONTINUED | OUTPATIENT
Start: 2019-10-23 | End: 2019-10-24

## 2019-10-23 RX ORDER — METOCLOPRAMIDE HYDROCHLORIDE 5 MG/ML
10 INJECTION INTRAMUSCULAR; INTRAVENOUS EVERY 8 HOURS PRN
Status: DISCONTINUED | OUTPATIENT
Start: 2019-10-23 | End: 2019-10-24

## 2019-10-23 RX ORDER — HYDRALAZINE HYDROCHLORIDE 20 MG/ML
10 INJECTION INTRAMUSCULAR; INTRAVENOUS EVERY 4 HOURS PRN
Status: DISCONTINUED | OUTPATIENT
Start: 2019-10-23 | End: 2019-10-24

## 2019-10-23 RX ORDER — METOPROLOL TARTRATE 50 MG/1
50 TABLET, FILM COATED ORAL
Status: DISCONTINUED | OUTPATIENT
Start: 2019-10-23 | End: 2019-10-24

## 2019-10-23 NOTE — CM/SW NOTE
SW reviewed pt's chart. Pt lives at home w/spouse who is noted to have dementia. Pt has a caregiver. Left a message for pt's daughter to assess for needs. Awaiting a call back.

## 2019-10-23 NOTE — SIGNIFICANT EVENT
Responded to code stroke paged at 3932. Pt arrived to ED at 2105 via car with c/o confusion per family. Son at bedside states caregiver left house at [phone] and pt was nl. When pt talked to son on phone at 2000, pt was not making sense with speech.     Pt is

## 2019-10-23 NOTE — ED INITIAL ASSESSMENT (HPI)
PT called his son about 5 tonight and was not making since.  Son went over and Pt was not making since and brought him to hospital.

## 2019-10-23 NOTE — PHYSICAL THERAPY NOTE
PT eval order received, chart reviewed. Per stroke protocol and stroke committee recommendation, patient is on bedrest for 24 hrs from ADT time of 21:12 on 10/22. PT will evaluate the patient once activity level is upgraded.

## 2019-10-23 NOTE — PLAN OF CARE
Patient awake, alert to person, place; disoriented to time and situation  Telemetry, Sinus Rhythm   Denies pain/discomfort  Slight Right facial droop; dysarthria; delayed responses; mild expressive aphasia  NIH-5  CVA protocol maintained  Bedrest; skin jr neurological status  Description  INTERVENTIONS  - Assess for and report changes in neurological status  - Initiate measures to prevent increased intracranial pressure  - Maintain blood pressure and fluid volume within ordered parameters to optimize cerebr

## 2019-10-23 NOTE — ED PROVIDER NOTES
Patient Seen in: [facility] Emergency Department      History   Patient presents with:  Altered Mental Status (neurologic)    Stated Complaint: confusion today    HPI    This is a 68-year-old right-handed male on Xarelto since 6/2019 with past medic hypertension    • Visual impairment     reading glasses              Past Surgical History:   Procedure Laterality Date   • APPENDECTOMY     • CABG  1998    CABG X 4   • CABG, ARTERIAL, SINGLE  `1997   • COLONOSCOPY,DIAGNOSTIC  5/15/07  [facility] Dennise Lyn)    Hx rubs or gallops. ABDOMEN: Soft, nontender and nondistended. Normoactive bowel sounds. No rebound. No guarding. EXTREMITIES: Warm with brisk capillary refill. Neuro: Speech clear. No facial asymmetry. No pronator drift.  Motor strength 5 out of 5 in Abnormal            Final result                 Please view results for these tests on the individual orders.    BASIC METABOLIC PANEL (8)   CBC WITH DIFFERENTIAL WITH PLATELET   BLOOD CULTURE   BLOOD CULTURE     EKG    Rate, intervals and axes as mental status R41.82 10/22/2019 Unknown

## 2019-10-23 NOTE — CM/SW NOTE
10/23/19 1100   CM/SW Referral Data   Referral Source Social Work (self-referral)   Reason for Referral Discharge [facility]   Patient lives with Spouse;Caregiver   Discharge Needs   A

## 2019-10-23 NOTE — CONSULTS
[facility]    Report of Consultation    [facility] Patient Status:  Observation    1929 MRN [id number]   [facility] 7NE-A Attending Brice Harper MD   Hosp Day # 0 PCP Rosibel Webber MD     Date of Admission:  10/ jim    • TONSILLECTOMY       Family History   Problem Relation Age of Onset   • Cancer Father         lung cancer   • Other Mother         COPD      reports that he quit smoking about 55 years ago. He smoked 1.00 pack per day.  He has never used smoke midline. Uvula and palate elevate symmetrically. Shrug shoulders normally bilaterally. The rest of the cranial nerves are grossly intact. Sensation to light touch is intact bilaterally. Motor:  No arm or leg weakness noted.   Finger-to-nose coordinatio mellitus without complication, without long-term current use of insulin (HCC)     Sepsis due to urinary tract infection (HCC)     Fatigue, unspecified type     Fall, initial encounter     Community acquired pneumonia of left lower lobe of lung ([facility] [address]     Ao

## 2019-10-23 NOTE — OCCUPATIONAL THERAPY NOTE
Order received for OT evaluation. Per stroke protocol and stroke committee recommendation, patient is on bedrest for 24 hrs from ADT time of 21:12 on 10/22. OT will evaluate the patient once activity level is upgraded.

## 2019-10-23 NOTE — SLP NOTE
ADULT SWALLOWING EVALUATION    ASSESSMENT    ASSESSMENT/OVERALL IMPRESSION:  Pt seen at bedside this PM for bedside swallow evaluation. Speech consulted per CVA protocol. Pt cooperative, pleasant, and alert.  Per RN documentation and report, pt failed RN dy • High blood pressure    • High cholesterol    • Left hydrocele 7/25/2014   • Melanoma ([address]     Left cheek (10 yrs ago)   • Mixed hyperlipidemia 7/25/2014   • Obesity 7/25/2014   • Other and unspecified hyperlipidemia    • Trigger middle finger of left complaints consistent with possible esophageal involvement      GOALS  Goal #1 The patient will tolerate regular consistency and thin liquids without overt signs or symptoms of aspiration with 100 % accuracy over 1 session(s).   Not Addressed   Goal #2 The

## 2019-10-24 VITALS
DIASTOLIC BLOOD PRESSURE: 53 MMHG | RESPIRATION RATE: 18 BRPM | SYSTOLIC BLOOD PRESSURE: 124 MMHG | TEMPERATURE: 98 F | HEART RATE: 69 BPM | OXYGEN SATURATION: 93 %

## 2019-10-24 PROCEDURE — 99214 OFFICE O/P EST MOD 30 MIN: CPT | Performed by: OTHER

## 2019-10-24 RX ORDER — LOSARTAN POTASSIUM 50 MG/1
50 TABLET ORAL
Qty: 30 TABLET | Refills: 0 | Status: SHIPPED | OUTPATIENT
Start: 2019-10-24 | End: 2019-11-14

## 2019-10-24 RX ORDER — METOPROLOL TARTRATE 50 MG/1
50 TABLET, FILM COATED ORAL 2 TIMES DAILY
Qty: 60 TABLET | Refills: 0 | Status: SHIPPED | OUTPATIENT
Start: 2019-10-24 | End: 2019-11-04

## 2019-10-24 RX ORDER — DEXTROSE MONOHYDRATE 25 G/50ML
50 INJECTION, SOLUTION INTRAVENOUS
Status: DISCONTINUED | OUTPATIENT
Start: 2019-10-24 | End: 2019-10-24

## 2019-10-24 RX ORDER — ASPIRIN 81 MG/1
81 TABLET, CHEWABLE ORAL DAILY
Qty: 30 TABLET | Refills: 0 | Status: SHIPPED | COMMUNITY
Start: 2019-10-25

## 2019-10-24 RX ORDER — LOSARTAN POTASSIUM 50 MG/1
50 TABLET ORAL
Status: DISCONTINUED | OUTPATIENT
Start: 2019-10-24 | End: 2019-10-24

## 2019-10-24 RX ORDER — DILTIAZEM HYDROCHLORIDE 120 MG/1
120 CAPSULE, EXTENDED RELEASE ORAL DAILY
Status: DISCONTINUED | OUTPATIENT
Start: 2019-10-24 | End: 2019-10-24

## 2019-10-24 NOTE — PROGRESS NOTES
[id number] Gabrielle Brewer Neurology Progress Note    [facility] Patient Status:  Observation    1929 MRN [id number]   [facility] 7NE-A Attending Xiomara Silverman MD   Hosp Day # 0 PCP Eneida Carrington MD         Subjective:   Todd Roe right cerebellum.  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 6. No perfusion abnormality or mismatch to the cerebral hemispheres. 7. Details as above.  Continued clinical correlation recommended.     • Insulin Aspart Pen  1-5 Units Subcut DAINA Rock  [facility]  10/24/2019  7:41 AM  Spectra [id number]    Neurology Attending Addendum:  I have seen the patient independently, reviewed the history, labs and imaging, and agree with above note with following additio

## 2019-10-24 NOTE — DISCHARGE SUMMARY
General Medicine Discharge Summary     Patient ID:  Janel Christine  80year old  4/28/1929    Admit date: 10/22/2019    Discharge date and time: 10/24/2019    Attending Physician: Anita Hanson DO taking these medications    aspirin 81 MG Oral Chew Tab  Chew 1 tablet (81 mg total) by mouth daily. CONTINUE these medications which have CHANGED    metFORMIN HCl 500 MG Oral Tab  Take 1 tablet (500 mg total) by mouth 2 (two) times daily. With meals. masses, trachae midline.  No thyromegaly  Pulm: Lungs clear bilaterally, normal respiratory effort  CV: Heart with regular rate and rhythm, no murmur  GI: Abdomen soft, nontender, nondistended, no organomegaly, bowel sounds present  Ext: No cyanosis, clubbi

## 2019-10-24 NOTE — PROGRESS NOTES
10/24/19 2362   Clinical Encounter Type   Visited With   (Patient declined  visit.)   Continue Visiting No

## 2019-10-24 NOTE — PLAN OF CARE
Problem: Patient/Family Goals  Goal: Patient/Family Long Term Goal  Description  Patient's Long Term Goal:    Interventions:  - See additional Care Plan goals for specific interventions  Outcome: Progressing  Goal: Patient/Family Short Term Goal  Descrip care  Description  INTERVENTIONS  - Monitor swallowing and airway patency with patient fatigue and changes in neurological status  - Encourage and assist patient to increase activity and self care with guidance from PT/OT  - Encourage visually impaired, he Communication  Goal: Patient will achieve maximal communication potential  Description  Interventions:  Outcome: Progressing

## 2019-10-24 NOTE — PLAN OF CARE
Assumed care for this pt at [address] [place] [address]. Pt alert and oriented, slight slurred speech ( see neuro assessments). Pt continues iv fluids, pt down for MRI this evening. Pt denies any complaints at this time.  Systolic pressures elevated neuro notified and prn given,

## 2019-10-24 NOTE — PHYSICAL THERAPY NOTE
PHYSICAL THERAPY EVALUATION - INPATIENT     Room Number: 5154/5362-P  Evaluation Date: 10/24/2019  Type of Evaluation: Initial  Physician Order: PT Eval and Treat    Presenting Problem: Altered Mental Status; TIA  Reason for Therapy: Mobility Dysfunc SITUATION  Type of Home: House   Home Layout: One level  Stairs to Enter : 2  Railing: Yes          Lives With: Spouse;Caregiver part-time  Drives: Yes  Patient Owned Equipment: Rolling walker  Patient Regularly Uses: Glasses    Prior Level of [place] Symmetrical             ACTIVITY TOLERANCE                         O2 WALK                  AM-PAC '6-Clicks' INPATIENT SHORT FORM - BASIC MOBILITY  How much difficulty does the patient currently have. ..  -   Turning over in bed (including adjusting bedclo set    ASSESSMENT   Patient is a 80year old male admitted on 10/22/2019 for altered mental status, suspected TIA. Pertinent comorbidities and personal factors impacting therapy include HTN, DM, CAD, obesity, HL, A-fib.   In this PT evaluation, the patient #5    Goal #6    Goal Comments: Goals established on 10/24/2019

## 2019-10-24 NOTE — OCCUPATIONAL THERAPY NOTE
OCCUPATIONAL THERAPY EVALUATION - INPATIENT     Room Number: 3392/9288-J  Evaluation Date: 10/24/2019  Type of Evaluation: Initial  Presenting Problem: altered mental status, possible TIA    Physician Order: IP Consult to Occupational Therapy  Reason for T Dr. Gema Conrad    • OTHER SURGICAL HISTORY  07/12/2019    urolift - dr. Mckeon [facility]    • [facility] SITUATION  Type of Home: House  Home Layout: One level  Lives With: Spouse;Caregiver part-time    Toilet and Equipment: difficulty    PERCEPTION  Overall Perception Status:   WFL - within functional limits    SENSATION  Light touch:  intact    Communication:  Clear speech    Behavioral/Emotional/Social: pleasant    RANGE OF MOTION AND STRENGTH ASSESSMENT  Upper extremity RO shift attention between tasks. Chair alarm on. Patient End of Session: Up in chair;Needs met;Call light within reach;RN aware of session/findings; All patient questions and concerns addressed; Alarm set    ASSESSMENT     Patient is a 80year old male a conservation/work simplification techniques;ADL training;Functional transfer training;UE strengthening/ROM; Cognitive reorientation;Patient/Family education;Patient/Family training;Equipment eval/education; Compensatory technique education  Rehab Potential :

## 2019-10-24 NOTE — SLP NOTE
SPEECH DAILY NOTE - INPATIENT    ASSESSMENT & PLAN   ASSESSMENT  Pt seen at bedside this PM for speech therapy services. Pt cooperative, pleasant, and alert. Per RN documentation, pt tolerating diet well with no concerns of aspiration.  Trial thin liquids v

## 2019-10-24 NOTE — PROGRESS NOTES
10/24/19 1444   Clinical Encounter Type   Visited With Patient   Routine Visit   (Responded to consult)   Patient Spiritual Encounters   Spiritual Assessment Completed Yes    introduced self. Assessed for spiritual and psychosocial needs.  Tatyana

## 2019-10-24 NOTE — PLAN OF CARE
Assumed care of patient at 07:00 am.  Pt A/O x 4. VSS. Home BP medications ordered, SBP lowered to 120-150 from prior 160-180.   Pt and family after multiple discussions decided on discharge to home with 24 hr supervision and [place] PT.  Diabetic education and quality is noted   10/24/2019 1848 by Emery Reece RN  Outcome: Adequate for Discharge  10/24/2019 1846 by Emery Reece RN  Outcome: Adequate for Discharge  10/24/2019 1834 by Emery Reece RN  Outcome: Adequate for Discharge  10/24/2019 1833 by Yoly Brewer patency with patient fatigue and changes in neurological status  - Encourage and assist patient to increase activity and self care with guidance from PT/OT  - Encourage visually impaired, hearing impaired and aphasic patients to use assistive/communication Consider post-discharge preferences of patient/family/discharge partner  - Complete POLST form as appropriate  - Assess patient's ability to be responsible for managing their own health  - Refer to Case Management Department for coordinating discharge plan care  Description  Interventions:  - Assess ability and encourage patient to participate in ADLs to maximize function  - Promote sitting position while performing ADLs such as feeding, grooming, and bathing  - Educate and encourage patient/family in [place]

## 2019-10-24 NOTE — PROGRESS NOTES
[facility] Hospitalist Progress Note                                                                   [address] [facility]  4/28/1929    SUBJECTIVE:  Pt seen and examined.   Denies HA, blurry vision, food   • aspirin  81 mg Oral Daily     Continuous Infusions:   • sodium chloride 75 mL/hr at 10/24/19 0521     PRN: glucose **OR** Glucose-Vitamin C **OR** dextrose **OR** glucose **OR** Glucose-Vitamin C, acetaminophen, ondansetron HCl, Metoclopramide HCl

## 2019-10-25 NOTE — PROGRESS NOTES
NURSING DISCHARGE NOTE    Patient and son given and reviewed discharge instructions. All questions answered, verbalized understanding. Stroke and Diabetic education completed. All questions answered, verbalized understanding.   Signs and symptoms of st

## 2019-10-28 PROBLEM — G31.9 DIFFUSE CEREBRAL ATROPHY (HCC): Status: ACTIVE | Noted: 2019-10-28

## 2019-10-28 PROBLEM — J18.9 COMMUNITY ACQUIRED PNEUMONIA OF LEFT LOWER LOBE OF LUNG: Status: RESOLVED | Noted: 2019-05-26 | Resolved: 2019-10-28

## 2019-10-28 PROBLEM — I77.1 TORTUOSITY OF ARTERY (HCC): Status: ACTIVE | Noted: 2019-10-28

## 2019-10-28 PROBLEM — E11.9 TYPE 2 DIABETES MELLITUS WITHOUT COMPLICATION, WITHOUT LONG-TERM CURRENT USE OF INSULIN (HCC): Status: RESOLVED | Noted: 2019-03-22 | Resolved: 2019-10-28

## 2019-10-28 PROBLEM — R33.9 URINARY RETENTION: Status: ACTIVE | Noted: 2019-10-28

## 2019-10-28 PROBLEM — I77.89 ECTASIA OF ARTERY (HCC): Status: ACTIVE | Noted: 2019-10-28

## 2019-10-28 PROBLEM — A41.9 SEPSIS DUE TO URINARY TRACT INFECTION (HCC): Status: RESOLVED | Noted: 2019-05-26 | Resolved: 2019-10-28
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
## 2019-10-28 PROBLEM — N39.0 SEPSIS DUE TO URINARY TRACT INFECTION (HCC): Status: RESOLVED | Noted: 2019-05-26 | Resolved: 2019-10-28

## 2019-10-30 PROBLEM — R41.82 ALTERED MENTAL STATUS: Status: RESOLVED | Noted: 2019-10-22 | Resolved: 2019-10-30

## 2019-10-30 PROBLEM — Z86.73 HX OF TRANSIENT ISCHEMIC ATTACK (TIA): Status: ACTIVE | Noted: 2019-10-23

## 2019-11-01 PROBLEM — G45.9 TRANSIENT ISCHEMIC ATTACK (TIA): Status: ACTIVE | Noted: 2019-11-01

## 2019-11-01 PROBLEM — N40.1 BENIGN LOCALIZED HYPERPLASIA OF PROSTATE WITH URINARY RETENTION: Status: ACTIVE | Noted: 2019-11-01

## 2019-11-01 PROBLEM — R33.8 BENIGN LOCALIZED HYPERPLASIA OF PROSTATE WITH URINARY RETENTION: Status: ACTIVE | Noted: 2019-11-01

## 2019-11-10 PROBLEM — G45.9 TRANSIENT ISCHEMIC ATTACK (TIA): Status: RESOLVED | Noted: 2019-11-01 | Resolved: 2019-11-10

## 2019-11-10 PROBLEM — R53.83 FATIGUE, UNSPECIFIED TYPE: Status: RESOLVED | Noted: 2019-05-26 | Resolved: 2019-11-10

## 2020-01-09 ENCOUNTER — OFFICE VISIT (OUTPATIENT)
Dept: NEUROLOGY | Facility: CLINIC | Age: 85
End: 2020-01-09
Payer: MEDICARE

## 2020-01-09 VITALS
DIASTOLIC BLOOD PRESSURE: 80 MMHG | HEART RATE: 74 BPM | SYSTOLIC BLOOD PRESSURE: 130 MMHG | RESPIRATION RATE: 18 BRPM | WEIGHT: 248 LBS | BODY MASS INDEX: 36 KG/M2

## 2020-01-09 DIAGNOSIS — I48.0 PAROXYSMAL ATRIAL FIBRILLATION (HCC): ICD-10-CM

## 2020-01-09 DIAGNOSIS — I16.0 HYPERTENSIVE URGENCY: Primary | ICD-10-CM

## 2020-01-09 PROCEDURE — 99213 OFFICE O/P EST LOW 20 MIN: CPT | Performed by: OTHER

## 2020-01-09 NOTE — PROGRESS NOTES
Jayda 1827   Neurology- INITIAL CLINIC VISIT  2020, 2:51 PM     Cheyenne Saleh Patient Status:  No patient class for patient encounter    1929 MRN [id number]   Location [address] [place] [address] mismatch to the cerebral hemispheres. 7. Details as above. Continued clinical correlation recommended. MRI brain 10/2019  CONCLUSION:    1. No acute intracranial pathology.   2. Global atrophy and chronic ischemic changes of the cerebral white matter a • HERNIA SURGERY      Bilateral   • HYDROCELECTOMY ADULT Left 11/13/2014    Performed by Annette Chahal MD at [facility] MAIN OR   • LEFT PHACOEMULSIFICATION OF CATARACT WITH INTRAOCULAR LENS IMPLANT [id number] Left 11/20/2019    Performed by Jake Bronson MD a Rfl: 0  •  finasteride 5 MG Oral Tab, Take 1 tablet (5 mg total) by mouth daily. , Disp: 30 tablet, Rfl: 3  •  atorvastatin 40 MG Oral Tab, TAKE 1 TABLET EVERY NIGHT, Disp: 90 tablet, Rfl: 1  •  Rivaroxaban 20 MG Oral Tab, Take 1 tablet (20 mg total) by saul grossly intact. Shoulder shrug was normal.   Motor exam revealed normal muscle bulk and tone. No atrophy or fasciculations. Manual muscle testing revealed MRC grade 5/5 strength throughout including proximal and distal muscles of the arms and legs.   Deep t

## 2020-04-15 PROBLEM — I65.09 OCCLUSION OF VERTEBRAL ARTERY: Status: RESOLVED | Noted: 2020-04-15 | Resolved: 2020-04-15

## 2020-04-15 PROBLEM — I65.09 OCCLUSION OF VERTEBRAL ARTERY: Status: ACTIVE | Noted: 2020-04-15

## 2020-06-10 PROBLEM — D68.69 SECONDARY HYPERCOAGULABLE STATE (HCC): Status: ACTIVE | Noted: 2020-06-10

## 2020-06-10 PROBLEM — Z79.01 LONG TERM (CURRENT) USE OF ANTICOAGULANTS: Status: ACTIVE | Noted: 2020-06-10

## 2020-06-10 PROBLEM — I77.810 DILATED AORTIC ROOT (HCC): Status: ACTIVE | Noted: 2020-06-10

## 2020-06-10 PROBLEM — I73.9 PVD (PERIPHERAL VASCULAR DISEASE) (HCC): Status: ACTIVE | Noted: 2020-06-10

## 2020-08-28 RX ORDER — RIVAROXABAN 20 MG/1
TABLET, FILM COATED ORAL
Qty: 30 TABLET | Refills: 3 | Status: SHIPPED | OUTPATIENT
Start: 2020-08-28

## 2021-05-14 PROBLEM — I48.0 PAROXYSMAL ATRIAL FIBRILLATION (HCC): Status: ACTIVE | Noted: 2021-05-14

## 2021-05-14 PROBLEM — R05.9 COUGH: Status: ACTIVE | Noted: 2021-05-14

## 2021-05-14 PROBLEM — R06.00 PND (PAROXYSMAL NOCTURNAL DYSPNEA): Status: ACTIVE | Noted: 2021-05-14

## 2021-06-28 PROBLEM — I65.29 CAROTID ATHEROSCLEROSIS: Status: ACTIVE | Noted: 2021-06-28

## 2021-06-28 PROBLEM — E27.8 ADRENAL NODULE (HCC): Status: ACTIVE | Noted: 2021-06-28

## 2021-06-28 PROBLEM — Z79.01 LONG TERM (CURRENT) USE OF ANTICOAGULANTS: Status: RESOLVED | Noted: 2020-06-10 | Resolved: 2021-06-28

## 2021-07-06 PROBLEM — N18.6 ESRD (END STAGE RENAL DISEASE) (HCC): Status: ACTIVE | Noted: 2021-07-06

## 2022-02-09 PROBLEM — R05.9 COUGH: Status: RESOLVED | Noted: 2021-05-14 | Resolved: 2022-02-09

## 2022-02-09 PROBLEM — R33.9 URINARY RETENTION: Status: RESOLVED | Noted: 2019-10-28 | Resolved: 2022-02-09

## 2022-12-20 ENCOUNTER — APPOINTMENT (OUTPATIENT)
Dept: CT IMAGING | Facility: HOSPITAL | Age: 87
End: 2022-12-20
Attending: EMERGENCY MEDICINE
Payer: MEDICARE

## 2022-12-20 ENCOUNTER — HOSPITAL ENCOUNTER (EMERGENCY)
Facility: HOSPITAL | Age: 87
Discharge: HOME | End: 2022-12-20
Attending: EMERGENCY MEDICINE
Payer: MEDICARE

## 2022-12-20 VITALS
SYSTOLIC BLOOD PRESSURE: 140 MMHG | HEIGHT: 70 IN | OXYGEN SATURATION: 95 % | TEMPERATURE: 97 F | BODY MASS INDEX: 37.22 KG/M2 | WEIGHT: 260 LBS | HEART RATE: 64 BPM | RESPIRATION RATE: 18 BRPM | DIASTOLIC BLOOD PRESSURE: 73 MMHG

## 2022-12-20 DIAGNOSIS — E27.8 ADRENAL NODULE (HCC): ICD-10-CM

## 2022-12-20 DIAGNOSIS — N40.0 BENIGN PROSTATIC HYPERPLASIA WITHOUT LOWER URINARY TRACT SYMPTOMS: ICD-10-CM

## 2022-12-20 DIAGNOSIS — K57.90 DIVERTICULOSIS: ICD-10-CM

## 2022-12-20 DIAGNOSIS — M54.50 ACUTE LEFT-SIDED LOW BACK PAIN WITHOUT SCIATICA: Primary | ICD-10-CM

## 2022-12-20 LAB
ALBUMIN SERPL-MCNC: 3.4 G/DL (ref 3.4–5)
ALBUMIN/GLOB SERPL: 0.8 {RATIO} (ref 1–2)
ALP LIVER SERPL-CCNC: 44 U/L
ALT SERPL-CCNC: 31 U/L
ANION GAP SERPL CALC-SCNC: 4 MMOL/L (ref 0–18)
AST SERPL-CCNC: 13 U/L (ref 15–37)
BASOPHILS # BLD AUTO: 0.02 X10(3) UL (ref 0–0.2)
BASOPHILS NFR BLD AUTO: 0.2 %
BILIRUB SERPL-MCNC: 0.4 MG/DL (ref 0.1–2)
BILIRUB UR QL STRIP.AUTO: NEGATIVE
BUN BLD-MCNC: 29 MG/DL (ref 7–18)
CALCIUM BLD-MCNC: 9 MG/DL (ref 8.5–10.1)
CHLORIDE SERPL-SCNC: 107 MMOL/L (ref 98–112)
CLARITY UR REFRACT.AUTO: CLEAR
CO2 SERPL-SCNC: 27 MMOL/L (ref 21–32)
COLOR UR AUTO: YELLOW
CREAT BLD-MCNC: 1.43 MG/DL
EOSINOPHIL # BLD AUTO: 0.18 X10(3) UL (ref 0–0.7)
EOSINOPHIL NFR BLD AUTO: 2 %
ERYTHROCYTE [DISTWIDTH] IN BLOOD BY AUTOMATED COUNT: 14.8 %
GFR SERPLBLD BASED ON 1.73 SQ M-ARVRAT: 46 ML/MIN/1.73M2 (ref 60–?)
GLOBULIN PLAS-MCNC: 4.5 G/DL (ref 2.8–4.4)
GLUCOSE BLD-MCNC: 131 MG/DL (ref 70–99)
GLUCOSE UR STRIP.AUTO-MCNC: NEGATIVE MG/DL
HCT VFR BLD AUTO: 33.5 %
HGB BLD-MCNC: 11 G/DL
HYALINE CASTS #/AREA URNS AUTO: PRESENT /LPF
IMM GRANULOCYTES # BLD AUTO: 0.05 X10(3) UL (ref 0–1)
IMM GRANULOCYTES NFR BLD: 0.5 %
KETONES UR STRIP.AUTO-MCNC: NEGATIVE MG/DL
LYMPHOCYTES # BLD AUTO: 3.67 X10(3) UL (ref 1–4)
LYMPHOCYTES NFR BLD AUTO: 40.3 %
MCH RBC QN AUTO: 32.1 PG (ref 26–34)
MCHC RBC AUTO-ENTMCNC: 32.8 G/DL (ref 31–37)
MCV RBC AUTO: 97.7 FL
MONOCYTES # BLD AUTO: 0.97 X10(3) UL (ref 0.1–1)
MONOCYTES NFR BLD AUTO: 10.6 %
NEUTROPHILS # BLD AUTO: 4.22 X10 (3) UL (ref 1.5–7.7)
NEUTROPHILS # BLD AUTO: 4.22 X10(3) UL (ref 1.5–7.7)
NEUTROPHILS NFR BLD AUTO: 46.4 %
NITRITE UR QL STRIP.AUTO: NEGATIVE
OSMOLALITY SERPL CALC.SUM OF ELEC: 294 MOSM/KG (ref 275–295)
PH UR STRIP.AUTO: 6.5 [PH] (ref 5–8)
PLATELET # BLD AUTO: 260 10(3)UL (ref 150–450)
POTASSIUM SERPL-SCNC: 4.5 MMOL/L (ref 3.5–5.1)
PROT SERPL-MCNC: 7.9 G/DL (ref 6.4–8.2)
RBC # BLD AUTO: 3.43 X10(6)UL
RBC UR QL AUTO: NEGATIVE
SODIUM SERPL-SCNC: 138 MMOL/L (ref 136–145)
SP GR UR STRIP.AUTO: 1.02 (ref 1–1.03)
UROBILINOGEN UR STRIP.AUTO-MCNC: 1 MG/DL
WBC # BLD AUTO: 9.1 X10(3) UL (ref 4–11)

## 2022-12-20 PROCEDURE — 87086 URINE CULTURE/COLONY COUNT: CPT | Performed by: EMERGENCY MEDICINE

## 2022-12-20 PROCEDURE — 99284 EMERGENCY DEPT VISIT MOD MDM: CPT

## 2022-12-20 PROCEDURE — 85025 COMPLETE CBC W/AUTO DIFF WBC: CPT | Performed by: EMERGENCY MEDICINE

## 2022-12-20 PROCEDURE — 36415 COLL VENOUS BLD VENIPUNCTURE: CPT

## 2022-12-20 PROCEDURE — 81001 URINALYSIS AUTO W/SCOPE: CPT | Performed by: EMERGENCY MEDICINE

## 2022-12-20 PROCEDURE — 74177 CT ABD & PELVIS W/CONTRAST: CPT | Performed by: EMERGENCY MEDICINE

## 2022-12-20 PROCEDURE — 81015 MICROSCOPIC EXAM OF URINE: CPT | Performed by: EMERGENCY MEDICINE

## 2022-12-20 PROCEDURE — 80053 COMPREHEN METABOLIC PANEL: CPT | Performed by: EMERGENCY MEDICINE
